# Patient Record
Sex: MALE | Race: ASIAN | URBAN - METROPOLITAN AREA
[De-identification: names, ages, dates, MRNs, and addresses within clinical notes are randomized per-mention and may not be internally consistent; named-entity substitution may affect disease eponyms.]

---

## 2022-09-29 ENCOUNTER — TELEPHONE (OUTPATIENT)
Dept: UROLOGY | Age: 47
End: 2022-09-29

## 2022-09-29 DIAGNOSIS — N23 RENAL COLIC ON LEFT SIDE: Primary | ICD-10-CM

## 2022-09-29 RX ORDER — HYDROCODONE BITARTRATE AND ACETAMINOPHEN 5; 325 MG/1; MG/1
1 TABLET ORAL
Qty: 10 TABLET | Refills: 0 | OUTPATIENT
Start: 2022-09-29 | End: 2022-10-02

## 2022-09-29 NOTE — TELEPHONE ENCOUNTER
Patient has a known 6mm left proximal ureteral stone seen on 9/23/2022 CT scan, w/ hydronephrosis. Pt was seen in office with Dr. Clement Bland 9/27/22 with plan for MET and follow up in 3-4 weeks if unable to pass stone. Pt is now having worsening renal colic, minimally improved with Norco.  Recently stopped Percocet due to constipation- pt is still having constipation. No urinary symptoms or fever/chills and recent UA 9/27/22 is negative. Discussed option for ureteral stent placement as a temporary measure to possibly alleviate some discomfort but patient declines stent. Pt agreeable to continue taking pain medication, Flomax and hydrate with a sooner surgery date. Rx sent for more Norco, alternate with NSAIDs and take a mild laxative/stool softener daily. Msg sent to  for follow up for stone surgery.